# Patient Record
Sex: FEMALE | Race: WHITE | NOT HISPANIC OR LATINO | Employment: FULL TIME | URBAN - METROPOLITAN AREA
[De-identification: names, ages, dates, MRNs, and addresses within clinical notes are randomized per-mention and may not be internally consistent; named-entity substitution may affect disease eponyms.]

---

## 2021-09-11 ENCOUNTER — IMMUNIZATION (OUTPATIENT)
Dept: VACCINE CLINIC | Facility: HOSPITAL | Age: 66
End: 2021-09-11

## 2021-09-11 PROCEDURE — 0001A: CPT | Performed by: INTERNAL MEDICINE

## 2021-09-11 PROCEDURE — 91300 HC SARSCOV02 VAC 30MCG/0.3ML IM: CPT | Performed by: INTERNAL MEDICINE

## 2025-01-14 ENCOUNTER — PRE-ADMISSION TESTING (OUTPATIENT)
Dept: PREADMISSION TESTING | Facility: HOSPITAL | Age: 70
End: 2025-01-14
Payer: MEDICARE

## 2025-01-14 VITALS
DIASTOLIC BLOOD PRESSURE: 70 MMHG | HEIGHT: 64 IN | WEIGHT: 178 LBS | SYSTOLIC BLOOD PRESSURE: 117 MMHG | BODY MASS INDEX: 30.39 KG/M2 | OXYGEN SATURATION: 96 % | HEART RATE: 80 BPM | RESPIRATION RATE: 20 BRPM | TEMPERATURE: 97.4 F

## 2025-01-14 LAB
ANION GAP SERPL CALCULATED.3IONS-SCNC: 14.5 MMOL/L (ref 5–15)
BUN SERPL-MCNC: 20 MG/DL (ref 8–23)
BUN/CREAT SERPL: 23.8 (ref 7–25)
CALCIUM SPEC-SCNC: 9.3 MG/DL (ref 8.6–10.5)
CHLORIDE SERPL-SCNC: 99 MMOL/L (ref 98–107)
CO2 SERPL-SCNC: 28.5 MMOL/L (ref 22–29)
CREAT SERPL-MCNC: 0.84 MG/DL (ref 0.57–1)
DEPRECATED RDW RBC AUTO: 41.3 FL (ref 37–54)
EGFRCR SERPLBLD CKD-EPI 2021: 74.4 ML/MIN/1.73
ERYTHROCYTE [DISTWIDTH] IN BLOOD BY AUTOMATED COUNT: 12.7 % (ref 12.3–15.4)
GLUCOSE SERPL-MCNC: 94 MG/DL (ref 65–99)
HCT VFR BLD AUTO: 37.3 % (ref 34–46.6)
HGB BLD-MCNC: 12.7 G/DL (ref 12–15.9)
MCH RBC QN AUTO: 30.6 PG (ref 26.6–33)
MCHC RBC AUTO-ENTMCNC: 34 G/DL (ref 31.5–35.7)
MCV RBC AUTO: 89.9 FL (ref 79–97)
PLATELET # BLD AUTO: 241 10*3/MM3 (ref 140–450)
PMV BLD AUTO: 10.4 FL (ref 6–12)
POTASSIUM SERPL-SCNC: 3.7 MMOL/L (ref 3.5–5.2)
QT INTERVAL: 433 MS
QTC INTERVAL: 454 MS
RBC # BLD AUTO: 4.15 10*6/MM3 (ref 3.77–5.28)
SODIUM SERPL-SCNC: 142 MMOL/L (ref 136–145)
WBC NRBC COR # BLD AUTO: 7.37 10*3/MM3 (ref 3.4–10.8)

## 2025-01-14 PROCEDURE — 85027 COMPLETE CBC AUTOMATED: CPT

## 2025-01-14 PROCEDURE — 36415 COLL VENOUS BLD VENIPUNCTURE: CPT

## 2025-01-14 PROCEDURE — 93005 ELECTROCARDIOGRAM TRACING: CPT

## 2025-01-14 PROCEDURE — 80048 BASIC METABOLIC PNL TOTAL CA: CPT

## 2025-01-14 RX ORDER — FEXOFENADINE HCL 180 MG/1
180 TABLET ORAL DAILY
COMMUNITY

## 2025-01-14 NOTE — DISCHARGE INSTRUCTIONS
Take the following medications the morning of surgery:      SINGULAIR, ALLEGRA, LEVOTHYROXINE, SINGULAIR, OMEPRAZOLE      If you are on prescription narcotic pain medication to control your pain you may also take that medication the morning of surgery.      General Instructions:     Do not eat solid food after midnight the night before surgery.  Clear liquids day of surgery are allowed but must be stopped at least two hours before your hospital arrival time.       Allowed clear liquids      Water, sodas, and tea or coffee with no cream or milk added.       12 to 20 ounces of a clear liquid that contains carbohydrates is recommended.  If non-diabetic, have Gatorade or Powerade.  If diabetic, have G2 or Powerade Zero.     Do not have liquids red in color.  Do not consume chicken, beef, pork or vegetable broth or bouillon cubes of any variety as they are not considered clear liquids and are not allowed.      Infants may have breast milk up to four hours before surgery.  Infants drinking formula may drink formula up to six hours before surgery.   Patients who avoid smoking, chewing tobacco and alcohol for 4 weeks prior to surgery have a reduced risk of post-operative complications.  Quit smoking as many days before surgery as you can.  Do not smoke, use chewing tobacco or drink alcohol the day of surgery.   If applicable bring your C-PAP/ BI-PAP machine in with you to preop day of surgery.  Bring any papers given to you in the doctor’s office.  Wear clean comfortable clothes.  Do not wear contact lenses, false eyelashes or make-up.  Bring a case for your glasses.   Bring crutches or walker if applicable.  Remove all piercings.  Leave jewelry and any other valuables at home.  Hair extensions with metal clips must be removed prior to surgery.  The Pre-Admission Testing nurse will instruct you to bring medications if unable to obtain an accurate list in Pre-Admission Testing.        Preventing a Surgical Site  Infection:  For 2 to 3 days before surgery, avoid shaving with a razor because the razor can irritate skin and make it easier to develop an infection.    Any areas of open skin can increase the risk of a post-operative wound infection by allowing bacteria to enter and travel throughout the body.  Notify your surgeon if you have any skin wounds / rashes even if it is not near the expected surgical site.  The area will need assessed to determine if surgery should be delayed until it is healed.  The night prior to surgery shower using a fresh bar of anti-bacterial soap (such as Dial) and clean washcloth.  Sleep in a clean bed with clean clothing.  Do not allow pets to sleep with you.  Shower on the morning of surgery using a fresh bar of anti-bacterial soap (such as Dial) and clean washcloth.  Dry with a clean towel and dress in clean clothing.  Ask your surgeon if you will be receiving antibiotics prior to surgery.  Make sure you, your family, and all healthcare providers clean their hands with soap and water or an alcohol based hand  before caring for you or your wound.    Day of surgery:  Your arrival time is approximately two hours before your scheduled surgery time.  Please note if you have an early arrival time the surgery doors do not open before 5:00 AM.  Upon arrival, a Pre-op nurse and Anesthesiologist will review your health history, obtain vital signs, and answer questions you may have.  The only belongings needed at this time will be a list of your home medications and if applicable your C-PAP/BI-PAP machine.  A Pre-op nurse will start an IV and you may receive medication in preparation for surgery, including something to help you relax.     Please be aware that surgery does come with discomfort.  We want to make every effort to control your discomfort so please discuss any uncontrolled symptoms with your nurse.   Your doctor will most likely have prescribed pain medications.      If you are going  home after surgery you will receive individualized written care instructions before being discharged.  A responsible adult must drive you to and from the hospital on the day of your surgery and ideally stay with you through the night.   .  Discharge prescriptions can be filled by the hospital pharmacy during regular pharmacy hours.  If you are having surgery late in the day/evening your prescription may be e-prescribed to your pharmacy.  Please verify your pharmacy hours or chose a 24 hour pharmacy to avoid not having access to your prescription because your pharmacy has closed for the day.    If you are staying overnight following surgery, you will be transported to your hospital room following the recovery period.  [facility] has all private rooms.    If you have any questions please call Pre-Admission Testing at [phone].  Deductibles and co-payments are collected on the day of service. Please be prepared to pay the required co-pay, deductible or deposit on the day of service as defined by your plan.    Call your surgeon immediately if you experience any of the following symptoms:  Sore Throat  Shortness of Breath or difficulty breathing  Cough  Chills  Body soreness or muscle pain  Headache  Fever  New loss of taste or smell  Do not arrive for your surgery ill.  Your procedure will need to be rescheduled to another time.  You will need to call your physician before the day of surgery to avoid any unnecessary exposure to hospital staff as well as other patients.        CHLORHEXIDINE CLOTH INSTRUCTIONS  The morning of surgery follow these instructions using the Chlorhexidine cloths you've been given.  These steps reduce bacteria on the body.  Do not use the cloths near your eyes, ears mouth, genitalia or on open wounds.  Throw the cloths away after use but do not try to flush them down a toilet.      Open and remove one cloth at a time from the package.    Leave the cloth unfolded and begin the  bathing.  Massage the skin with the cloths using gentle pressure to remove bacteria.  Do not scrub harshly.   Follow the steps below with one 2% CHG cloth per area (6 total cloths).  One cloth for neck, shoulders and chest.  One cloth for both arms, hands, fingers and underarms (do underarms last).  One cloth for the abdomen followed by groin.  One cloth for right leg and foot including between the toes.  One cloth for left leg and foot including between the toes.  The last cloth is to be used for the back of the neck, back and buttocks.    Allow the CHG to air dry 3 minutes on the skin which will give it time to work and decrease the chance of irritation.  The skin may feel sticky until it is dry.  Do not rinse with water or any other liquid or you will lose the beneficial effects of the CHG.  If mild skin irritation occurs, do rinse the skin to remove the CHG.  Report this to the nurse at time of admission.  Do not apply lotions, creams, ointments, deodorants or perfumes after using the clothes. Dress in clean clothes before coming to the hospital.

## 2025-01-15 ENCOUNTER — ANESTHESIA EVENT (OUTPATIENT)
Dept: PERIOP | Facility: HOSPITAL | Age: 70
End: 2025-01-15
Payer: MEDICARE

## 2025-01-16 ENCOUNTER — ANESTHESIA (OUTPATIENT)
Dept: PERIOP | Facility: HOSPITAL | Age: 70
End: 2025-01-16
Payer: MEDICARE

## 2025-01-16 ENCOUNTER — HOSPITAL ENCOUNTER (OUTPATIENT)
Facility: HOSPITAL | Age: 70
Setting detail: HOSPITAL OUTPATIENT SURGERY
Discharge: HOME | End: 2025-01-16
Attending: PLASTIC SURGERY | Admitting: PLASTIC SURGERY
Payer: MEDICARE

## 2025-01-16 VITALS
TEMPERATURE: 97.8 F | BODY MASS INDEX: 30.37 KG/M2 | OXYGEN SATURATION: 95 % | RESPIRATION RATE: 16 BRPM | HEART RATE: 66 BPM | HEIGHT: 64 IN | SYSTOLIC BLOOD PRESSURE: 108 MMHG | WEIGHT: 177.91 LBS | DIASTOLIC BLOOD PRESSURE: 69 MMHG

## 2025-01-16 DIAGNOSIS — N62 MACROMASTIA: ICD-10-CM

## 2025-01-16 PROCEDURE — 25010000002 LIDOCAINE 1% - EPINEPHRINE 1:100000 1 %-1:100000 SOLUTION 20 ML VIAL: Performed by: PLASTIC SURGERY

## 2025-01-16 PROCEDURE — 25010000002 DEXAMETHASONE PER 1 MG: Performed by: NURSE ANESTHETIST, CERTIFIED REGISTERED

## 2025-01-16 PROCEDURE — 25010000002 LIDOCAINE 2% SOLUTION: Performed by: NURSE ANESTHETIST, CERTIFIED REGISTERED

## 2025-01-16 PROCEDURE — 25010000002 GENTAMICIN PER 80 MG: Performed by: PLASTIC SURGERY

## 2025-01-16 PROCEDURE — 25010000002 EPINEPHRINE PER 0.1 MG: Performed by: PLASTIC SURGERY

## 2025-01-16 PROCEDURE — 25010000002 ONDANSETRON PER 1 MG: Performed by: NURSE ANESTHETIST, CERTIFIED REGISTERED

## 2025-01-16 PROCEDURE — 88305 TISSUE EXAM BY PATHOLOGIST: CPT | Performed by: PLASTIC SURGERY

## 2025-01-16 PROCEDURE — 25010000002 FENTANYL CITRATE (PF) 50 MCG/ML SOLUTION: Performed by: NURSE ANESTHETIST, CERTIFIED REGISTERED

## 2025-01-16 PROCEDURE — 25010000002 LIDOCAINE 1 % SOLUTION 10 ML VIAL: Performed by: PLASTIC SURGERY

## 2025-01-16 PROCEDURE — 25010000002 HYDROMORPHONE PER 4 MG: Performed by: NURSE ANESTHETIST, CERTIFIED REGISTERED

## 2025-01-16 PROCEDURE — 25010000002 CEFAZOLIN PER 500 MG: Performed by: PLASTIC SURGERY

## 2025-01-16 PROCEDURE — 25010000002 PROPOFOL 200 MG/20ML EMULSION: Performed by: NURSE ANESTHETIST, CERTIFIED REGISTERED

## 2025-01-16 PROCEDURE — 25010000002 SUGAMMADEX 200 MG/2ML SOLUTION: Performed by: NURSE ANESTHETIST, CERTIFIED REGISTERED

## 2025-01-16 PROCEDURE — 63710000001 ONDANSETRON ODT 4 MG TABLET DISPERSIBLE: Performed by: PLASTIC SURGERY

## 2025-01-16 PROCEDURE — 25810000003 LACTATED RINGERS PER 1000 ML: Performed by: ANESTHESIOLOGY

## 2025-01-16 PROCEDURE — 25810000003 LACTATED RINGERS PER 1000 ML: Performed by: PLASTIC SURGERY

## 2025-01-16 DEVICE — KNOTLESS TISSUE CONTROL DEVICE, UNDYED UNIDIRECTIONAL (ANTIBACTERIAL) SYNTHETIC ABSORBABLE DEVICE
Type: IMPLANTABLE DEVICE | Site: BREAST | Status: FUNCTIONAL
Brand: STRATAFIX

## 2025-01-16 RX ORDER — FAMOTIDINE 10 MG/ML
20 INJECTION, SOLUTION INTRAVENOUS ONCE
Status: COMPLETED | OUTPATIENT
Start: 2025-01-16 | End: 2025-01-16

## 2025-01-16 RX ORDER — GABAPENTIN 300 MG/1
300 CAPSULE ORAL ONCE
Status: COMPLETED | OUTPATIENT
Start: 2025-01-16 | End: 2025-01-16

## 2025-01-16 RX ORDER — ONDANSETRON 2 MG/ML
INJECTION INTRAMUSCULAR; INTRAVENOUS AS NEEDED
Status: DISCONTINUED | OUTPATIENT
Start: 2025-01-16 | End: 2025-01-16 | Stop reason: SURG

## 2025-01-16 RX ORDER — HYDROMORPHONE HYDROCHLORIDE 1 MG/ML
0.25 INJECTION, SOLUTION INTRAMUSCULAR; INTRAVENOUS; SUBCUTANEOUS
Status: DISCONTINUED | OUTPATIENT
Start: 2025-01-16 | End: 2025-01-16 | Stop reason: HOSPADM

## 2025-01-16 RX ORDER — LIDOCAINE HYDROCHLORIDE 10 MG/ML
0.5 INJECTION, SOLUTION INFILTRATION; PERINEURAL ONCE AS NEEDED
Status: DISCONTINUED | OUTPATIENT
Start: 2025-01-16 | End: 2025-01-16 | Stop reason: HOSPADM

## 2025-01-16 RX ORDER — OXYCODONE AND ACETAMINOPHEN 7.5; 325 MG/1; MG/1
1 TABLET ORAL EVERY 4 HOURS PRN
Status: DISCONTINUED | OUTPATIENT
Start: 2025-01-16 | End: 2025-01-16 | Stop reason: HOSPADM

## 2025-01-16 RX ORDER — FLUMAZENIL 0.1 MG/ML
0.2 INJECTION INTRAVENOUS AS NEEDED
Status: DISCONTINUED | OUTPATIENT
Start: 2025-01-16 | End: 2025-01-16 | Stop reason: HOSPADM

## 2025-01-16 RX ORDER — SODIUM CHLORIDE, SODIUM LACTATE, POTASSIUM CHLORIDE, CALCIUM CHLORIDE 600; 310; 30; 20 MG/100ML; MG/100ML; MG/100ML; MG/100ML
125 INJECTION, SOLUTION INTRAVENOUS CONTINUOUS
Status: DISCONTINUED | OUTPATIENT
Start: 2025-01-16 | End: 2025-01-16 | Stop reason: HOSPADM

## 2025-01-16 RX ORDER — OXYCODONE HYDROCHLORIDE 5 MG/1
2.5 TABLET ORAL EVERY 4 HOURS PRN
Qty: 15 TABLET | Refills: 0 | Status: SHIPPED | OUTPATIENT
Start: 2025-01-16

## 2025-01-16 RX ORDER — TRANEXAMIC ACID 100 MG/ML
INJECTION, SOLUTION INTRAVENOUS AS NEEDED
Status: DISCONTINUED | OUTPATIENT
Start: 2025-01-16 | End: 2025-01-16 | Stop reason: SURG

## 2025-01-16 RX ORDER — OXYCODONE HYDROCHLORIDE 5 MG/1
10 TABLET ORAL ONCE
Status: COMPLETED | OUTPATIENT
Start: 2025-01-16 | End: 2025-01-16

## 2025-01-16 RX ORDER — BACITRACIN ZINC 500 [USP'U]/G
OINTMENT TOPICAL AS NEEDED
Status: DISCONTINUED | OUTPATIENT
Start: 2025-01-16 | End: 2025-01-16 | Stop reason: HOSPADM

## 2025-01-16 RX ORDER — SODIUM CHLORIDE 0.9 % (FLUSH) 0.9 %
3 SYRINGE (ML) INJECTION EVERY 12 HOURS SCHEDULED
Status: DISCONTINUED | OUTPATIENT
Start: 2025-01-16 | End: 2025-01-16 | Stop reason: HOSPADM

## 2025-01-16 RX ORDER — ACETAMINOPHEN 500 MG
1000 TABLET ORAL ONCE
Status: COMPLETED | OUTPATIENT
Start: 2025-01-16 | End: 2025-01-16

## 2025-01-16 RX ORDER — SODIUM CHLORIDE, SODIUM LACTATE, POTASSIUM CHLORIDE, CALCIUM CHLORIDE 600; 310; 30; 20 MG/100ML; MG/100ML; MG/100ML; MG/100ML
9 INJECTION, SOLUTION INTRAVENOUS CONTINUOUS
Status: DISCONTINUED | OUTPATIENT
Start: 2025-01-16 | End: 2025-01-16 | Stop reason: HOSPADM

## 2025-01-16 RX ORDER — LIDOCAINE HYDROCHLORIDE 20 MG/ML
INJECTION, SOLUTION INFILTRATION; PERINEURAL AS NEEDED
Status: DISCONTINUED | OUTPATIENT
Start: 2025-01-16 | End: 2025-01-16 | Stop reason: SURG

## 2025-01-16 RX ORDER — DEXAMETHASONE SODIUM PHOSPHATE 4 MG/ML
INJECTION, SOLUTION INTRA-ARTICULAR; INTRALESIONAL; INTRAMUSCULAR; INTRAVENOUS; SOFT TISSUE AS NEEDED
Status: DISCONTINUED | OUTPATIENT
Start: 2025-01-16 | End: 2025-01-16 | Stop reason: SURG

## 2025-01-16 RX ORDER — IPRATROPIUM BROMIDE AND ALBUTEROL SULFATE 2.5; .5 MG/3ML; MG/3ML
3 SOLUTION RESPIRATORY (INHALATION) ONCE AS NEEDED
Status: DISCONTINUED | OUTPATIENT
Start: 2025-01-16 | End: 2025-01-16 | Stop reason: HOSPADM

## 2025-01-16 RX ORDER — NALOXONE HCL 0.4 MG/ML
0.2 VIAL (ML) INJECTION AS NEEDED
Status: DISCONTINUED | OUTPATIENT
Start: 2025-01-16 | End: 2025-01-16 | Stop reason: HOSPADM

## 2025-01-16 RX ORDER — ONDANSETRON 4 MG/1
4 TABLET, ORALLY DISINTEGRATING ORAL ONCE
Status: COMPLETED | OUTPATIENT
Start: 2025-01-16 | End: 2025-01-16

## 2025-01-16 RX ORDER — PROMETHAZINE HYDROCHLORIDE 25 MG/1
12.5 SUPPOSITORY RECTAL ONCE AS NEEDED
Status: DISCONTINUED | OUTPATIENT
Start: 2025-01-16 | End: 2025-01-16 | Stop reason: HOSPADM

## 2025-01-16 RX ORDER — ONDANSETRON 2 MG/ML
4 INJECTION INTRAMUSCULAR; INTRAVENOUS ONCE AS NEEDED
Status: COMPLETED | OUTPATIENT
Start: 2025-01-16 | End: 2025-01-16

## 2025-01-16 RX ORDER — SODIUM CHLORIDE 0.9 % (FLUSH) 0.9 %
3-10 SYRINGE (ML) INJECTION AS NEEDED
Status: DISCONTINUED | OUTPATIENT
Start: 2025-01-16 | End: 2025-01-16 | Stop reason: HOSPADM

## 2025-01-16 RX ORDER — LABETALOL HYDROCHLORIDE 5 MG/ML
5 INJECTION, SOLUTION INTRAVENOUS
Status: DISCONTINUED | OUTPATIENT
Start: 2025-01-16 | End: 2025-01-16 | Stop reason: HOSPADM

## 2025-01-16 RX ORDER — EPHEDRINE SULFATE 50 MG/ML
5 INJECTION, SOLUTION INTRAVENOUS ONCE AS NEEDED
Status: DISCONTINUED | OUTPATIENT
Start: 2025-01-16 | End: 2025-01-16 | Stop reason: HOSPADM

## 2025-01-16 RX ORDER — FENTANYL CITRATE 50 UG/ML
50 INJECTION, SOLUTION INTRAMUSCULAR; INTRAVENOUS ONCE AS NEEDED
Status: DISCONTINUED | OUTPATIENT
Start: 2025-01-16 | End: 2025-01-16 | Stop reason: HOSPADM

## 2025-01-16 RX ORDER — DEXMEDETOMIDINE HYDROCHLORIDE 100 UG/ML
INJECTION, SOLUTION INTRAVENOUS AS NEEDED
Status: DISCONTINUED | OUTPATIENT
Start: 2025-01-16 | End: 2025-01-16 | Stop reason: SURG

## 2025-01-16 RX ORDER — AMOXICILLIN 250 MG
2 CAPSULE ORAL DAILY PRN
Qty: 30 TABLET | Refills: 1 | Status: SHIPPED | OUTPATIENT
Start: 2025-01-16 | End: 2026-01-16

## 2025-01-16 RX ORDER — MIDAZOLAM HYDROCHLORIDE 1 MG/ML
0.5 INJECTION, SOLUTION INTRAMUSCULAR; INTRAVENOUS
Status: DISCONTINUED | OUTPATIENT
Start: 2025-01-16 | End: 2025-01-16 | Stop reason: HOSPADM

## 2025-01-16 RX ORDER — ATROPINE SULFATE 0.4 MG/ML
0.4 INJECTION, SOLUTION INTRAMUSCULAR; INTRAVENOUS; SUBCUTANEOUS ONCE AS NEEDED
Status: DISCONTINUED | OUTPATIENT
Start: 2025-01-16 | End: 2025-01-16 | Stop reason: HOSPADM

## 2025-01-16 RX ORDER — HYDRALAZINE HYDROCHLORIDE 20 MG/ML
5 INJECTION INTRAMUSCULAR; INTRAVENOUS
Status: DISCONTINUED | OUTPATIENT
Start: 2025-01-16 | End: 2025-01-16 | Stop reason: HOSPADM

## 2025-01-16 RX ORDER — DIPHENHYDRAMINE HYDROCHLORIDE 50 MG/ML
12.5 INJECTION INTRAMUSCULAR; INTRAVENOUS
Status: DISCONTINUED | OUTPATIENT
Start: 2025-01-16 | End: 2025-01-16 | Stop reason: HOSPADM

## 2025-01-16 RX ORDER — PROMETHAZINE HYDROCHLORIDE 12.5 MG/1
12.5 TABLET ORAL ONCE AS NEEDED
Status: DISCONTINUED | OUTPATIENT
Start: 2025-01-16 | End: 2025-01-16 | Stop reason: HOSPADM

## 2025-01-16 RX ORDER — ROCURONIUM BROMIDE 10 MG/ML
INJECTION, SOLUTION INTRAVENOUS AS NEEDED
Status: DISCONTINUED | OUTPATIENT
Start: 2025-01-16 | End: 2025-01-16 | Stop reason: SURG

## 2025-01-16 RX ORDER — ONDANSETRON 8 MG/1
8 TABLET, ORALLY DISINTEGRATING ORAL EVERY 8 HOURS PRN
Qty: 10 TABLET | Refills: 1 | Status: SHIPPED | OUTPATIENT
Start: 2025-01-16

## 2025-01-16 RX ORDER — FENTANYL CITRATE 50 UG/ML
25 INJECTION, SOLUTION INTRAMUSCULAR; INTRAVENOUS
Status: DISCONTINUED | OUTPATIENT
Start: 2025-01-16 | End: 2025-01-16 | Stop reason: HOSPADM

## 2025-01-16 RX ORDER — OXYCODONE AND ACETAMINOPHEN 5; 325 MG/1; MG/1
1 TABLET ORAL ONCE AS NEEDED
Status: COMPLETED | OUTPATIENT
Start: 2025-01-16 | End: 2025-01-16

## 2025-01-16 RX ORDER — GABAPENTIN 100 MG/1
100 CAPSULE ORAL EVERY 8 HOURS
Qty: 60 CAPSULE | Refills: 1 | Status: SHIPPED | OUTPATIENT
Start: 2025-01-16 | End: 2026-01-16

## 2025-01-16 RX ORDER — DOCUSATE SODIUM 250 MG
250 CAPSULE ORAL DAILY PRN
Qty: 30 CAPSULE | Refills: 1 | Status: SHIPPED | OUTPATIENT
Start: 2025-01-16

## 2025-01-16 RX ORDER — ACETAMINOPHEN 325 MG/1
650 TABLET ORAL EVERY 4 HOURS PRN
Qty: 60 TABLET | Refills: 0 | Status: SHIPPED | OUTPATIENT
Start: 2025-01-16

## 2025-01-16 RX ORDER — FENTANYL CITRATE 50 UG/ML
INJECTION, SOLUTION INTRAMUSCULAR; INTRAVENOUS AS NEEDED
Status: DISCONTINUED | OUTPATIENT
Start: 2025-01-16 | End: 2025-01-16 | Stop reason: SURG

## 2025-01-16 RX ORDER — PROPOFOL 10 MG/ML
INJECTION, EMULSION INTRAVENOUS AS NEEDED
Status: DISCONTINUED | OUTPATIENT
Start: 2025-01-16 | End: 2025-01-16 | Stop reason: SURG

## 2025-01-16 RX ORDER — PROMETHAZINE HYDROCHLORIDE 12.5 MG/1
12.5 TABLET ORAL EVERY 6 HOURS PRN
Qty: 10 TABLET | Refills: 0 | Status: SHIPPED | OUTPATIENT
Start: 2025-01-16

## 2025-01-16 RX ADMIN — ONDANSETRON 4 MG: 2 INJECTION INTRAMUSCULAR; INTRAVENOUS at 09:54

## 2025-01-16 RX ADMIN — OXYCODONE AND ACETAMINOPHEN 1 TABLET: 5; 325 TABLET ORAL at 11:40

## 2025-01-16 RX ADMIN — PROPOFOL 150 MCG/KG/MIN: 10 INJECTION, EMULSION INTRAVENOUS at 07:09

## 2025-01-16 RX ADMIN — FAMOTIDINE 20 MG: 10 INJECTION, SOLUTION INTRAVENOUS at 06:52

## 2025-01-16 RX ADMIN — ROCURONIUM BROMIDE 20 MG: 10 INJECTION, SOLUTION INTRAVENOUS at 07:43

## 2025-01-16 RX ADMIN — HYDROMORPHONE HYDROCHLORIDE 0.25 MG: 1 INJECTION, SOLUTION INTRAMUSCULAR; INTRAVENOUS; SUBCUTANEOUS at 10:50

## 2025-01-16 RX ADMIN — ROCURONIUM BROMIDE 10 MG: 10 INJECTION, SOLUTION INTRAVENOUS at 09:05

## 2025-01-16 RX ADMIN — PROPOFOL 140 MG: 10 INJECTION, EMULSION INTRAVENOUS at 07:08

## 2025-01-16 RX ADMIN — LIDOCAINE HYDROCHLORIDE 80 MG: 20 INJECTION, SOLUTION INFILTRATION; PERINEURAL at 07:08

## 2025-01-16 RX ADMIN — OXYCODONE HYDROCHLORIDE 10 MG: 5 TABLET ORAL at 06:21

## 2025-01-16 RX ADMIN — DEXMEDETOMIDINE HYDROCHLORIDE 12 MCG: 100 INJECTION, SOLUTION INTRAVENOUS at 07:29

## 2025-01-16 RX ADMIN — SODIUM CHLORIDE 2000 MG: 900 INJECTION INTRAVENOUS at 06:53

## 2025-01-16 RX ADMIN — TRANEXAMIC ACID 1000 MG: 100 INJECTION, SOLUTION INTRAVENOUS at 07:24

## 2025-01-16 RX ADMIN — SODIUM CHLORIDE, POTASSIUM CHLORIDE, SODIUM LACTATE AND CALCIUM CHLORIDE: 600; 310; 30; 20 INJECTION, SOLUTION INTRAVENOUS at 06:57

## 2025-01-16 RX ADMIN — HYDROMORPHONE HYDROCHLORIDE 0.25 MG: 1 INJECTION, SOLUTION INTRAMUSCULAR; INTRAVENOUS; SUBCUTANEOUS at 10:38

## 2025-01-16 RX ADMIN — FENTANYL CITRATE 50 MCG: 50 INJECTION, SOLUTION INTRAMUSCULAR; INTRAVENOUS at 07:31

## 2025-01-16 RX ADMIN — SUGAMMADEX 200 MG: 100 INJECTION, SOLUTION INTRAVENOUS at 10:04

## 2025-01-16 RX ADMIN — ROCURONIUM BROMIDE 50 MG: 10 INJECTION, SOLUTION INTRAVENOUS at 07:08

## 2025-01-16 RX ADMIN — ONDANSETRON 4 MG: 2 INJECTION, SOLUTION INTRAMUSCULAR; INTRAVENOUS at 10:39

## 2025-01-16 RX ADMIN — ACETAMINOPHEN 1000 MG: 500 TABLET, FILM COATED ORAL at 06:20

## 2025-01-16 RX ADMIN — DEXAMETHASONE SODIUM PHOSPHATE 6 MG: 4 INJECTION, SOLUTION INTRA-ARTICULAR; INTRALESIONAL; INTRAMUSCULAR; INTRAVENOUS; SOFT TISSUE at 07:31

## 2025-01-16 RX ADMIN — GABAPENTIN 300 MG: 300 CAPSULE ORAL at 06:20

## 2025-01-16 RX ADMIN — ONDANSETRON 4 MG: 4 TABLET, ORALLY DISINTEGRATING ORAL at 06:22

## 2025-01-16 RX ADMIN — SODIUM CHLORIDE, POTASSIUM CHLORIDE, SODIUM LACTATE AND CALCIUM CHLORIDE 125 ML/HR: 600; 310; 30; 20 INJECTION, SOLUTION INTRAVENOUS at 06:23

## 2025-01-16 NOTE — ANESTHESIA POSTPROCEDURE EVALUATION
"Patient: Misty Kirkland    Procedure Summary       Date: 01/16/25 Room / Location: [facility] OR [address] [place] [address] MAIN OR    Anesthesia Start: 0659 Anesthesia Stop: 1017    Procedure: BILATERAL BREAST REDUCTION WITH SUCTION (Bilateral: Chest) Diagnosis:     Surgeons: Neno Lester MD Provider: Irving Martínez MD    Anesthesia Type: general ASA Status: 2            Anesthesia Type: general    Vitals  Vitals Value Taken Time   BP 99/61 01/16/25 1145   Temp 36.6 °C (97.8 °F) 01/16/25 1015   Pulse 70 01/16/25 1145   Resp 14 01/16/25 1130   SpO2 92 % 01/16/25 1145   Vitals shown include unfiled device data.        Post Anesthesia Care and Evaluation    Patient location during evaluation: bedside  Patient participation: complete - patient participated  Level of consciousness: awake and alert  Pain management: adequate    Airway patency: patent  Anesthetic complications: No anesthetic complications    Cardiovascular status: acceptable  Respiratory status: acceptable  Hydration status: acceptable    Comments: /63   Pulse 69   Temp 36.6 °C (97.8 °F) (Oral)   Resp 14   Ht 162 cm (63.78\")   Wt 80.7 kg (177 lb 14.6 oz)   SpO2 96%   BMI 30.75 kg/m²     "

## 2025-01-16 NOTE — H&P
Allergies  Reviewed Allergies  HYDROCODONE   MOLD   SULFA (SULFONAMIDE ANTIBIOTICS)   Medications  Reviewed Medications  Advair  mcg-21 mcg/actuation aerosol inhaler  INHALE TWO PUFFS BY MOUTH TWICE DAILY  12/28/23   filled surescripts  albuterol sulfate 2.5 mg/3 mL (0.083 %) solution for nebulization  INHALE ONE VIAL EVERY 4 TO 6 HOURS AS NEEDED FOR COUGH AND for wheezing  11/03/23   filled surescripts  albuterol sulfate HFA 90 mcg/actuation aerosol inhaler  INHALE TWO PUFFS BY MOUTH EVERY 6 HOURS AS NEEDED for wheezing OR FOR SHORTNESS OF BREATH  04/27/22   filled surescripts  amoxicillin 500 mg capsule  TAKE ONE TABLET BY MOUTH THREE TIMES DAILY UNTIL GONE  02/06/24   filled surescripts  amoxicillin 875 mg-potassium clavulanate 125 mg tablet  TAKE ONE TABLET BY MOUTH EVERY TWELVE HOURS FOR 10 DAYS  08/09/23   filled surescripts  azelastine 137 mcg (0.1 %) nasal spray  INSTILL 2 SPRAYS IN EACH NOSTRIL TWICE DAILY  11/30/23   filled surescripts  azithromycin 250 mg tablet  TAKE TWO TABLETS BY MOUTH TODAY, THEN ONE TABLET DAILY FOR FOUR DAYS  02/12/24   filled surescripts  benzonatate 100 mg capsule  TAKE ONE CAPSULE BY MOUTH THREE TIMES DAILY AS NEEDED FOR COUGH  03/18/24   filled surescripts  buPROPion HCL  mg 24 hr tablet, extended release  TAKE ONE TABLET BY MOUTH EVERY DAY  07/13/23   filled surescripts  buPROPion HCL  mg 24 hr tablet, extended release  TAKE ONE TABLET BY MOUTH EVERY DAY  05/20/22   filled surescripts  busPIRone 10 mg tablet  TAKE ONE TABLET BY MOUTH TWICE DAILY  07/13/23   filled surescripts  cefdinir 300 mg capsule  TAKE ONE CAPSULE BY MOUTH EVERY TWELVE HOURS FOR 7 DAYS  06/12/24   filled surescripts  celecoxib 200 mg capsule  TAKE ONE CAPSULE BY MOUTH TWICE DAILY AS NEEDED  05/28/24   filled surescripts  chlorthalidone 25 mg tablet  TAKE ONE TABLET BY MOUTH EVERY MORNING  06/14/24   filled surescripts  cholecalciferol (vitamin D3) 125 mcg (5,000 unit) capsule  TAKE ONE  CAPSULE BY MOUTH EVERY EVENING  05/16/24   filled surescripts  ciclopirox 8 % topical solution  APPLY TO THE AFFECTED AREA TOPICALLY EVERY NIGHT  05/13/22   filled surescripts  ciprofloxacin 500 mg tablet  TAKE ONE TABLET BY MOUTH TWICE DAILY  05/28/24   filled surescripts  citalopram 20 mg tablet  TAKE ONE TABLET BY MOUTH EVERY DAY  01/27/22   filled surescripts  citalopram 40 mg tablet  TAKE ONE TABLET BY MOUTH EVERY MORNING  05/16/24   filled surescripts  clindamycin  mg capsule  TAKE ONE CAPSULE BY MOUTH THREE TIMES DAILY FOR 7 DAYS  09/06/23   filled surescripts  cyanocobalamin (vit B-12) 1,000 mcg tablet  TAKE ONE TABLET BY MOUTH EVERY DAY  05/16/24   filled surescripts  Daily Probiotic (S. boulardii) 250 mg capsule  TAKE ONE CAPSULE BY MOUTH EVERY EVENING  05/16/24   filled surescripts  doxycycline hyclate 100 mg capsule  Take 1 capsule (100 mg total) by mouth 2 (two) times a day for 10 days. Take with at least 8 ounces (large glass) of water, do not lie down for 30 minutes after  05/15/22   filled surescripts  doxycycline hyclate 100 mg tablet  TAKE ONE TABLET BY MOUTH TWICE DAILY FOR 10 DAYS  12/28/21   filled surescripts  doxycycline monohydrate 100 mg capsule  TAKE ONE CAPSULE BY MOUTH TWICE DAILY  10/15/21   filled surescripts  doxycycline monohydrate 100 mg tablet  TAKE 1 TABLET BY MOUTH TWICE DAILY FOR 10 DAYS  07/23/23   filled surescripts  erythromycin 5 mg/gram (0.5 %) eye ointment  APPLY RICE SIZE AMOUNT TO BOTH EYELIDS TWICE DAILY FOR 7 DAYS AFTER SURGERY  08/04/21   filled surescripts  famotidine 20 mg tablet  TAKE ONE TABLET BY MOUTH TWICE DAILY  06/14/24   filled surescripts  fexofenadine 180 mg tablet  TAKE ONE TABLET BY MOUTH EVERY DAY  05/16/24   filled surescripts  fluconazole 150 mg tablet  TAKE ONE TABLET BY MOUTH ONCE FOR ONE DOSE  11/04/21   filled surescripts  Jublia 10 % topical solution with applicator  01/04/24   filled surescripts  lamoTRIgine 25 mg tablet  TAKE ONE TABLET  BY MOUTH AT BEDTIME  06/14/24   filled surescripts  levoFLOXacin 500 mg tablet  TAKE ONE TABLET BY MOUTH EVERY DAY  04/02/24   filled surescripts  levothyroxine 112 mcg tablet  TAKE ONE TABLET BY MOUTH EVERY DAY  06/14/24   filled surescripts  lisinopriL 20 mg tablet  TAKE ONE TABLET BY MOUTH EVERY DAY  06/14/24   filled surescripts  LORazepam 0.5 mg tablet  TAKE ONE TABLET BY MOUTH TWICE DAILY  06/05/24   filled surescripts  meloxicam 15 mg tablet  TAKE ONE TABLET BY MOUTH EVERY DAY WITH A A MEAL.  04/06/22   filled surescripts  methylPREDNISolone 4 mg tablets in a dose pack  TAKE 6 TABLETS BY MOUTH TODAY, THEN DECREASE BY 1 TABLET DAILY UNTIL GONE  05/15/22   filled surescripts  montelukast 10 mg tablet  TAKE ONE TABLET BY MOUTH EVERY DAY  06/14/24   filled surescripts  Mucus Relief ER 1,200 mg tablet, extended release  TAKE ONE TABLET BY MOUTH EVERY DAY  03/21/24   filled surescripts  nitrofurantoin monohydrate/macrocrystals 100 mg capsule  TAKE ONE CAPSULE BY MOUTH TWICE DAILY  03/30/24   filled surescripts  omeprazole 20 mg capsule,delayed release  TAKE ONE TABLET BY MOUTH AT NOON  05/20/22   filled surescripts  One Daily Multivitamin tablet  TAKE ONE TABLET BY MOUTH EVERY EVENING  05/16/24   filled surescripts  oseltamivir 75 mg capsule  TAKE ONE CAPSULE BY MOUTH TWICE DAILY FOR 5 DAYS  04/08/22   filled surescripts  oxyBUTYnin chloride ER 5 mg tablet,extended release 24 hr  TAKE ONE TABLET BY MOUTH EVERY DAY  05/20/22   filled surescripts  pravastatin 40 mg tablet  TAKE ONE TABLET BY MOUTH AT BEDTIME  06/14/24   filled surescripts  predniSONE 10 mg tablet  TAKE TWO TABLETS BY MOUTH EVERY DAY FOR 5 DAYS  06/07/24   filled surescripts  predniSONE 20 mg tablet  TAKE 1 TABLET BY MOUTH THREE TIMES DAILY FOR 5 DAYS  02/18/24   filled surescripts  predniSONE 50 mg tablet  TAKE ONE TABLET BY MOUTH DAILY  11/04/21   filled surescripts  PreserVision AREDS-2 250 mg-90 mg-40 mg-1 mg capsule  TAKE ONE CAPSULE BY MOUTH  TWICE DAILY  05/16/24   filled surescripts  promethazine-DM 6.25 mg-15 mg/5 mL oral syrup  TAKE 5ML BY MOUTH FOUR TIMES DAILY AS NEEDED FOR COUGH  02/12/24   filled surescripts  QUEtiapine 25 mg tablet  TAKE ONE TABLET BY MOUTH EVERY DAY  07/13/23   filled surescripts  rizatriptan 10 mg tablet  TAKE ONE TABLET BY MOUTH ONE TIME AT ONSET OF FOR HEADACHE. MAY REPEAT AFTER TWO HOURS IF FOR HEADACHE PRESISTS  04/15/24   filled surescripts  sodium,potassium,mag sulfates 17.5 gram-3.13 gram-1.6 gram oral soln  TAKE 354ml BY MOUTH ONCE FOR ONE DOSE  02/21/24   filled surescripts  spironolactone 50 mg tablet  TAKE ONE TABLET BY MOUTH EVERY DAY  11/30/23   filled surescripts  Trelegy Ellipta 100 mcg-62.5 mcg-25 mcg powder for inhalation  INHALE ONE PUFF BY MOUTH EVERY DAY  04/29/24   filled surescripts  valsartan 160 mg tablet  TAKE ONE TABLET BY MOUTH EVERY DAY  06/14/24   filled surescripts  valsartan 80 mg tablet  TAKE ONE TABLET BY MOUTH EVERY DAY  03/28/24   filled surescripts  zolpidem 5 mg tablet  TAKE ONE TABLET BY MOUTH AT NIGHT AS NEEDED FOR SLEEP  04/27/22   filled surescripts  Problems  Reviewed Problems  Family History  Family History not reviewed (last reviewed 03/26/2024)  Paternal Grandfather - Heart disease  Maternal Grandmother - Cerebrovascular accident    - Dementia    - Obesity    - Diabetes mellitus    - Hypertensive disorder  Mother - Hypertensive disorder    - Arthritis    - Diabetes mellitus    - Obesity    - Cerebrovascular accident    - Dementia  Brother - Hypertensive disorder    - Obesity  Sister - Hypertensive disorder    - Heart disease    - Chronic obstructive pulmonary disease    - Diabetes mellitus    - Substance abuse    - Myocardial infarction    - Obesity  Father - Arthritis    - Heart disease    - Myocardial infarction    - Hypertensive disorder  Maternal Grandfather - Diabetes mellitus    - Hypertensive disorder    - Heart disease  Unspecified Relation - Cerebrovascular accident  Social  History  Social History not reviewed (last reviewed 03/26/2024)  Substance Use  Do you or have you ever smoked tobacco?: Never smoker  What is your level of alcohol consumption?: Occasional  Surgical History  Surgical History not reviewed (last reviewed 03/26/2024)  Appendectomy  Caesarean Section  Cataract Surgery  Colonoscopy  Other  Tonsillectomy  Tubal Ligation  GYN History  (not configured)  Past Medical History  Past Medical History not reviewed (last reviewed 03/26/2024)  Arthritis: Y  Asthma/Wheezing: Y  Chronic Sinus Problems: Y  Depression/Anxiety: Y  Eye Disease: Y  Hearing Loss: Y  High Blood Pressure: Y  Palpitations/Irregular Heartbeat: Y  Recent Weight Change: Y  Thyroid Disease Explain____________: Y  Wear Glasses/Contact Lenses: Y  Wheezing/Asthma: Y  Screening  None recorded.  HPI  Has been diligent with weight loss, down to 177 lb.  ROS  Patient reports muscle aches and neck pain but reports no muscle weakness. She reports rash but reports no abnormal mole, no non-healing areas, and no breast lump; Shoulder grooving. She reports no fever, no night sweats, no significant weight gain, and no significant weight loss. She reports no dry eyes and no vision change. She reports no difficulty hearing. She reports no frequent nosebleeds. She reports no sore throat and no bleeding gums. She reports no chest pain, no arm pain on exertion, and no shortness of breath when walking. She reports no cough, no wheezing, and no shortness of breath. She reports no abdominal pain, no nausea, and no vomiting. She reports no gait dysfunction and no paralysis. She reports no dementia and no delirium.  Physical Exam  Chaperone: Chaperone: present.    Constitutional: General Appearance: healthy-appearing, well-nourished, and well-developed. Level of Distress: NAD. Ambulation: ambulating normally.    Psychiatric: Mental Status: normal mood and affect and active and alert.    Head: Head: normocephalic and  atraumatic.    Eyes: Lids and Conjunctivae: non-injected. Pupils: PERRLA. EOM: EOMI.    Cardiovascular: Heart Auscultation: RRR.    Breast: Breast Exam: Very large breast size with wide set footprint, left breast sits about 2 cm lower than the right but overall volume symmetry is similar, no discrete masses, intertriginous retained moisture and erythema.    Musculoskeletal:: Motor Strength and Tone: normal tone and motor strength. Joints, Bones, and Muscles: normal movement of all extremities. Extremities: no cyanosis or edema.    Neurologic: Gait and Station: normal gait and station.    Skin: Inspection and palpation: no rash or lesions.  Assessment / Plan  She would be a good candidate for medically necessary breast reduction. Her BSA is now 1.85 with a Schnur scale of 482 g. This would be an appropriate amount of resection volume of her and would provide relief from her significant symptoms. We discussed a Hou pattern reduction with superomedial pedicle. Additional lateral suctioning discussed to allow more weight removal without additional vascular and lymphatic disruptions. Discussed risk of bleeding, infection, poor wound healing, need for additional surgery, asymmetry, change in nipple sensation, partial total loss of the nipple areolar complex. She was given a copy of the ASPS consent form and my postoperative protocol. We will go ahead and submit for insurance coverage and then contact her for scheduling when we hear from them.

## 2025-01-16 NOTE — ANESTHESIA PROCEDURE NOTES
Airway  Urgency: elective    Date/Time: 1/16/2025 7:11 AM  End Time:1/16/2025 7:11 AM  Airway not difficult    General Information and Staff    Patient location during procedure: OR  CRNA/CAA: Sher Cordoba CRNA    Indications and Patient Condition  Indications for airway management: airway protection    Preoxygenated: yes  MILS maintained throughout  Mask difficulty assessment: 1 - vent by mask    Final Airway Details  Final airway type: endotracheal airway      Successful airway: ETT  Cuffed: yes   Successful intubation technique: direct laryngoscopy  Facilitating devices/methods: intubating stylet  Endotracheal tube insertion site: oral  Blade: Eng  Blade size: 2  ETT size (mm): 7.0  Cormack-Lehane Classification: grade I - full view of glottis  Placement verified by: chest auscultation and capnometry   Cuff volume (mL): 6  Measured from: lips  ETT/EBT  to lips (cm): 21  Number of attempts at approach: 1  Assessment: lips, teeth, and gum same as pre-op and atraumatic intubation

## 2025-01-16 NOTE — ANESTHESIA PREPROCEDURE EVALUATION
Anesthesia Evaluation     Patient summary reviewed and Nursing notes reviewed   history of anesthetic complications:  PONV  NPO Solid Status: > 8 hours  NPO Liquid Status: > 2 hours           Airway   Mallampati: II  TM distance: >3 FB  Neck ROM: full  Dental - normal exam     Pulmonary    (+) asthma,  (-) decreased breath sounds, wheezes  Cardiovascular - normal exam  Exercise tolerance: good (4-7 METS)    (+) dysrhythmias (SVT s/p 2 ablations), hyperlipidemia  (-) hypertension      Neuro/Psych  (+) psychiatric history  (-) seizures, CVA  GI/Hepatic/Renal/Endo    (+) GERD, thyroid problem   (-) diabetes    Musculoskeletal (-) negative ROS    Abdominal    Substance History - negative use  (-) alcohol use, drug use     OB/GYN negative ob/gyn ROS         Other - negative ROS                   Anesthesia Plan    ASA 2     general     intravenous induction     Anesthetic plan, risks, benefits, and alternatives have been provided, discussed and informed consent has been obtained with: patient.    CODE STATUS:

## 2025-01-17 LAB
CYTO UR: NORMAL
LAB AP CASE REPORT: NORMAL
LAB AP CLINICAL INFORMATION: NORMAL
PATH REPORT.FINAL DX SPEC: NORMAL
PATH REPORT.GROSS SPEC: NORMAL

## (undated) DEVICE — STPLR SKIN VISISTAT WD 35CT

## (undated) DEVICE — DRAIN,WOUND,15FR,3/16,FULL-FLUTED: Brand: MEDLINE

## (undated) DEVICE — STRIP CLS WND SUTURESTRIP/PLS 0.5X5IN TP1105

## (undated) DEVICE — ELECTRD BLD EZ CLN MOD XLNG 2.75IN

## (undated) DEVICE — TBG INFILTRATION CB 156IN

## (undated) DEVICE — INTENDED FOR TISSUE SEPARATION, AND OTHER PROCEDURES THAT REQUIRE A SHARP SURGICAL BLADE TO PUNCTURE OR CUT.: Brand: BARD-PARKER ® STAINLESS STEEL BLADES

## (undated) DEVICE — NDL HYPO PRECISIONGLIDE REG 25G 1 1/2

## (undated) DEVICE — PK UNIV COMPL 40

## (undated) DEVICE — TBG SXN LIPO 3/8IDX5/8IN ODX10FT DISP

## (undated) DEVICE — DRESSING,GAUZE,XEROFORM,CURAD,5"X9",ST: Brand: CURAD

## (undated) DEVICE — GLV SURG SENSICARE PI PF LF 7 GRN STRL

## (undated) DEVICE — CONTAINER,SPECIMEN,OR STERILE,4OZ: Brand: MEDLINE

## (undated) DEVICE — APPL CHLORAPREP HI/LITE 26ML ORNG

## (undated) DEVICE — NEEDLE, QUINCKE 22GX3.5": Brand: MEDLINE INDUSTRIES, INC.

## (undated) DEVICE — SMOKE EVACUATION TUBING WITH 7/8 IN TO 1/4 IN REDUCER: Brand: BUFFALO FILTER

## (undated) DEVICE — LINER CANSTR SXN QUICKFIT 3000CC

## (undated) DEVICE — RESERVOIR,SUCTION,100CC,SILICONE: Brand: MEDLINE

## (undated) DEVICE — CONN TBG Y 5 IN 1 LF STRL

## (undated) DEVICE — 3M™ STERI-DRAPE™ INCISE DRAPE 1050 (60CM X 45CM): Brand: STERI-DRAPE™

## (undated) DEVICE — SYR LUERLOK 30CC

## (undated) DEVICE — SUT ETHLN 3/0 PS1 18IN 1663H

## (undated) DEVICE — SOL NACL 0.9PCT 100ML SGL

## (undated) DEVICE — SUT PDS 2/0 SH 27IN Z317H

## (undated) DEVICE — DRAPE,TOWEL,LARGE,INVISISHIELD: Brand: MEDLINE

## (undated) DEVICE — SUT MNCRYL 3/0 PS2 18IN MCP497G

## (undated) DEVICE — BANDAGE,GAUZE,BULKEE II,4.5"X4.1YD,STRL: Brand: MEDLINE

## (undated) DEVICE — GLV SURG BIOGEL LTX PF 8 1/2

## (undated) DEVICE — SUT MNCRYL PLS ANTIB UD 4/0 PS2 18IN

## (undated) DEVICE — SYR LL TP 10ML STRL

## (undated) DEVICE — 1LYRTR 16FR10ML100%SIL UMS SNP: Brand: MEDLINE INDUSTRIES, INC.

## (undated) DEVICE — STPLR SKIN SUBCUTICULAR INSORB 2030